# Patient Record
Sex: FEMALE | Race: OTHER | NOT HISPANIC OR LATINO | Employment: UNEMPLOYED | URBAN - METROPOLITAN AREA
[De-identification: names, ages, dates, MRNs, and addresses within clinical notes are randomized per-mention and may not be internally consistent; named-entity substitution may affect disease eponyms.]

---
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
## 2024-08-30 ENCOUNTER — HOSPITAL ENCOUNTER (EMERGENCY)
Facility: HOSPITAL | Age: 19
Discharge: HOME | End: 2024-08-30
Attending: EMERGENCY MEDICINE
Payer: COMMERCIAL

## 2024-08-30 VITALS
SYSTOLIC BLOOD PRESSURE: 120 MMHG | RESPIRATION RATE: 18 BRPM | OXYGEN SATURATION: 98 % | DIASTOLIC BLOOD PRESSURE: 81 MMHG | HEART RATE: 74 BPM | TEMPERATURE: 99 F | WEIGHT: 230 LBS

## 2024-08-30 DIAGNOSIS — S60.221A CONTUSION OF RIGHT HAND, INITIAL ENCOUNTER: ICD-10-CM

## 2024-08-30 DIAGNOSIS — M54.9 BACK PAIN, UNSPECIFIED BACK LOCATION, UNSPECIFIED BACK PAIN LATERALITY, UNSPECIFIED CHRONICITY: ICD-10-CM
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
## 2024-08-30 DIAGNOSIS — M54.2 NECK PAIN: ICD-10-CM

## 2024-08-30 LAB — B-HCG UR QL: NEGATIVE

## 2024-08-30 PROCEDURE — 99285 EMERGENCY DEPT VISIT HI MDM: CPT | Mod: 25

## 2024-08-30 PROCEDURE — 81025 URINE PREGNANCY TEST: CPT | Performed by: NURSE PRACTITIONER

## 2024-08-30 PROCEDURE — 25000003 PHARM REV CODE 250: Performed by: PHYSICIAN ASSISTANT

## 2024-08-30 RX ORDER — METHOCARBAMOL 750 MG/1
1500 TABLET, FILM COATED ORAL 3 TIMES DAILY
Qty: 42 TABLET | Refills: 0 | Status: SHIPPED | OUTPATIENT
Start: 2024-08-30 | End: 2024-09-06

## 2024-08-30 RX ORDER — KETOROLAC TROMETHAMINE 10 MG/1
10 TABLET, FILM COATED ORAL
Status: COMPLETED | OUTPATIENT
Start: 2024-08-30 | End: 2024-08-30

## 2024-08-30 RX ORDER — DICLOFENAC SODIUM 50 MG/1
50 TABLET, DELAYED RELEASE ORAL 3 TIMES DAILY
Qty: 21 TABLET | Refills: 0 | Status: SHIPPED | OUTPATIENT
Start: 2024-08-30 | End: 2024-09-06

## 2024-08-30 RX ORDER — METHOCARBAMOL 500 MG/1
1000 TABLET, FILM COATED ORAL
Status: COMPLETED | OUTPATIENT
Start: 2024-08-30 | End: 2024-08-30

## 2024-08-30 RX ADMIN — METHOCARBAMOL 1000 MG: 500 TABLET ORAL at 12:08

## 2024-08-30 RX ADMIN — KETOROLAC TROMETHAMINE 10 MG: 10 TABLET, FILM COATED ORAL at 12:08
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
## 2024-08-30 NOTE — Clinical Note
Veila Juan accompanied their child to the emergency department on 8/30/2024. They may return to work on 09/02/2024.      If you have any questions or concerns, please don't hesitate to call.       RN

## 2024-08-30 NOTE — Clinical Note
Velia Juan accompanied their child to the emergency department on 8/30/2024. They may return to work on 09/02/2024.      If you have any questions or concerns, please don't hesitate to call.       RN

## 2024-08-30 NOTE — Clinical Note
Velia Juan accompanied their mother to the emergency department on 8/30/2024. They may return to work on 09/02/2024.      If you have any questions or concerns, please don't hesitate to call.

## 2024-08-30 NOTE — Clinical Note
"Hernandez "Hernandez" Robb was seen and treated in our emergency department on 8/30/2024.  She may return to work on 09/02/2024.       If you have any questions or concerns, please don't hesitate to call.      Soha Echeverria PA"
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
## 2024-08-30 NOTE — Clinical Note
Velia Juan accompanied their child to the emergency department on 8/30/2024. They may return to work on 09/02/2024.      If you have any questions or concerns, please don't hesitate to call.

## 2024-08-30 NOTE — Clinical Note
Velia Yessica accompanied their mother to the emergency department on 8/30/2024. They may return to work on 09/02/2024.      If you have any questions or concerns, please don't hesitate to call.       RN